# Patient Record
Sex: MALE | Race: WHITE | Employment: FULL TIME | URBAN - NONMETROPOLITAN AREA
[De-identification: names, ages, dates, MRNs, and addresses within clinical notes are randomized per-mention and may not be internally consistent; named-entity substitution may affect disease eponyms.]

---

## 2019-06-15 ENCOUNTER — HOSPITAL ENCOUNTER (EMERGENCY)
Age: 29
Discharge: HOME | End: 2019-06-15
Attending: FAMILY MEDICINE
Payer: COMMERCIAL

## 2019-06-15 ENCOUNTER — APPOINTMENT (OUTPATIENT)
Dept: GENERAL RADIOLOGY | Age: 29
End: 2019-06-15
Payer: COMMERCIAL

## 2019-06-15 VITALS
DIASTOLIC BLOOD PRESSURE: 69 MMHG | RESPIRATION RATE: 16 BRPM | HEART RATE: 100 BPM | WEIGHT: 175 LBS | OXYGEN SATURATION: 95 % | TEMPERATURE: 97.8 F | BODY MASS INDEX: 25.92 KG/M2 | HEIGHT: 69 IN | SYSTOLIC BLOOD PRESSURE: 120 MMHG

## 2019-06-15 DIAGNOSIS — J45.31 MILD PERSISTENT REACTIVE AIRWAY DISEASE WITH ACUTE EXACERBATION: Primary | ICD-10-CM

## 2019-06-15 DIAGNOSIS — J30.2 ACUTE SEASONAL ALLERGIC RHINITIS: ICD-10-CM

## 2019-06-15 PROCEDURE — 99284 EMERGENCY DEPT VISIT MOD MDM: CPT

## 2019-06-15 PROCEDURE — 6370000000 HC RX 637 (ALT 250 FOR IP)

## 2019-06-15 PROCEDURE — 71046 X-RAY EXAM CHEST 2 VIEWS: CPT

## 2019-06-15 PROCEDURE — 6370000000 HC RX 637 (ALT 250 FOR IP): Performed by: FAMILY MEDICINE

## 2019-06-15 RX ORDER — MONTELUKAST SODIUM 10 MG/1
10 TABLET ORAL NIGHTLY
Qty: 30 TABLET | Refills: 0 | Status: SHIPPED | OUTPATIENT
Start: 2019-06-15

## 2019-06-15 RX ORDER — ALBUTEROL SULFATE 90 UG/1
2 AEROSOL, METERED RESPIRATORY (INHALATION) EVERY 6 HOURS PRN
Qty: 1 INHALER | Refills: 3 | Status: SHIPPED | OUTPATIENT
Start: 2019-06-15

## 2019-06-15 RX ORDER — IPRATROPIUM BROMIDE AND ALBUTEROL SULFATE 2.5; .5 MG/3ML; MG/3ML
3 SOLUTION RESPIRATORY (INHALATION) ONCE
Status: COMPLETED | OUTPATIENT
Start: 2019-06-15 | End: 2019-06-15

## 2019-06-15 RX ORDER — PREDNISONE 10 MG/1
TABLET ORAL
Status: COMPLETED
Start: 2019-06-15 | End: 2019-06-15

## 2019-06-15 RX ORDER — FLUTICASONE PROPIONATE 50 MCG
SPRAY, SUSPENSION (ML) NASAL
Qty: 1 BOTTLE | Refills: 3 | Status: SHIPPED | OUTPATIENT
Start: 2019-06-15

## 2019-06-15 RX ORDER — PREDNISONE 50 MG/1
50 TABLET ORAL DAILY
Qty: 5 TABLET | Refills: 0 | Status: SHIPPED | OUTPATIENT
Start: 2019-06-15 | End: 2019-06-20

## 2019-06-15 RX ORDER — PREDNISONE 20 MG/1
60 TABLET ORAL ONCE
Status: COMPLETED | OUTPATIENT
Start: 2019-06-15 | End: 2019-06-15

## 2019-06-15 RX ADMIN — PREDNISONE 60 MG: 10 TABLET ORAL at 22:49

## 2019-06-15 RX ADMIN — IPRATROPIUM BROMIDE AND ALBUTEROL SULFATE 3 AMPULE: .5; 3 SOLUTION RESPIRATORY (INHALATION) at 22:48

## 2019-06-15 RX ADMIN — PREDNISONE 60 MG: 20 TABLET ORAL at 22:49

## 2019-06-16 NOTE — ED TRIAGE NOTES
Patient complains of shortness of breath and non productive cough for 1 week with no relief. Patient states history of Bronchitis with prescription of inhaler. Patient states some relief with inhaler use.

## 2019-06-16 NOTE — ED NOTES
Discharge instructions reviewed states understanding. Instructed on med use and need to f/u states understanding. Denies questions.       Lea Mathews RN  06/15/19 2904

## 2019-06-23 NOTE — ED PROVIDER NOTES
Triage Chief Complaint:   Shortness of Breath (SOB accompanied with a cough and congestion for a week, non productive cough. ) and Cough    [place]:  Oracio Hsu is a 30 y.o. male that presents history of similar symptoms but not as bad as the last several days who presents with runny nose, congestion, increased shortness of breath with activity, increased cough, and wheezing. Patient has tried over-the-counter medications with no improvement. No sore throat or stridor. No coryza fevers chills. No lower extremity edema or calf pain. No chest pain. ROS:  General:  No fevers, no chills, no weakness  Eyes:  No recent vison changes, no discharge  ENT: As above  Cardiovascular:  No chest pain, no palpitations  Respiratory: As above  Gastrointestinal:  No pain, no nausea, no vomiting, no diarrhea  Musculoskeletal:  No muscle pain, no joint pain  Skin:  No rash, no pruritis, no easy bruising  Neurologic:  No speech problems, no headache, no extremity numbness, no extremity tingling, no extremity weakness  Psychiatric:  No anxiety, no hallucinations or delusions, no suicidal or homicidal ideation  Genitourinary:  No dysuria, no hematuria  Endocrine:  No unexpected weight gain, no unexpected weight loss  Extremities:  no edema, no pain    History reviewed. No pertinent past medical history. Past Surgical History:   Procedure Laterality Date    HERNIA REPAIR       History reviewed. No pertinent family history.   Social History     Socioeconomic History    Marital status:      Spouse name: Not on file    Number of children: Not on file    Years of education: Not on file    Highest education level: Not on file   Occupational History    Not on file   Social Needs    Financial resource strain: Not on file    Food insecurity:     Worry: Not on file     Inability: Not on file    Transportation needs:     Medical: Not on file     Non-medical: Not on file   Tobacco Use    Smoking status: Never Smoker    Smokeless tobacco: Current User     Types: Chew   Substance and Sexual Activity    Alcohol use: Yes     Comment: occ    Drug use: No    Sexual activity: Yes     Partners: Female   Lifestyle    Physical activity:     Days per week: Not on file     Minutes per session: Not on file    Stress: Not on file   Relationships    Social connections:     Talks on phone: Not on file     Gets together: Not on file     Attends Restorationist service: Not on file     Active member of club or organization: Not on file     Attends meetings of clubs or organizations: Not on file     Relationship status: Not on file    Intimate partner violence:     Fear of current or ex partner: Not on file     Emotionally abused: Not on file     Physically abused: Not on file     Forced sexual activity: Not on file   Other Topics Concern    Not on file   Social History Narrative    Not on file     No current facility-administered medications for this encounter. Current Outpatient Medications   Medication Sig Dispense Refill    fluticasone (FLONASE) 50 MCG/ACT nasal spray Two sprays each nostril every single day in the AM 1 Bottle 3    albuterol sulfate HFA (PROVENTIL HFA) 108 (90 Base) MCG/ACT inhaler Inhale 2 puffs into the lungs every 6 hours as needed for Wheezing 1 Inhaler 3    montelukast (SINGULAIR) 10 MG tablet Take 1 tablet by mouth nightly 30 tablet 0     No Known Allergies    Nursing Notes Reviewed    Physical Exam:  ED Triage Vitals [06/15/19 2135]   Enc Vitals Group      /69      Pulse 100      Resp 16      Temp 97.8 °F (36.6 °C)      Temp src       SpO2 95 %      Weight 175 lb (79.4 kg)      Height 5' 9\" (1.753 m)      Head Circumference       Peak Flow       Pain Score       Pain Loc       Pain Edu? Excl. in [address]? My pulse ox interpretation is - normal    General appearance:  No acute distress. Skin:  Warm. Dry. No petechiae or purpura. Eye:  Extraocular movements intact.    PERRLA  Ears, nose, mouth and throat:  Oral mucosa moist, no trismus. Tympanic membranes bilaterally with poor movement. Oropharynx with no exudate or erythema. Boggy turbinates bilaterally. Postnasal drip present. Neck:  Trachea midline. Supple. No cervical lymphadenopathy  Extremity:  No swelling. Normal ROM. No calf pain or asymmetric swelling. No lower extremity edema  Heart:  Regular rate and rhythm, normal S1 & S2, no extra heart sounds. Perfusion:  Intact, capillary refill less than 2 seconds  Respiratory: Expiratory wheezing symmetrical bilaterally. I do not appreciate rales. Patient speaking in full sentences. No retractions. Pulse ox normal.  Abdominal:  Normal bowel sounds. Soft. No peritoneal signs. No hepatosplenomegaly. Back:  No CVA tenderness to palpation. No bruising. No CTL tenderness to palpation or step-off  Neurological:  Alert and oriented times 3. No focal neuro deficits. Cranial nerves II through XII are grossly intact. Psychiatric:  Appropriate    I have reviewed and interpreted all of the currently available lab results from this visit (if applicable):  No results found for this visit on 06/15/19. Radiographs (if obtained):  [] The following radiograph was interpreted by myself in the absence of a radiologist:   [] Radiologist's Report Reviewed:  XR CHEST STANDARD (2 VW)   Final Result   No evidence for acute cardiopulmonary process. EKG (if obtained): (All EKG's are interpreted by myself in the absence of a cardiologist)    Chart review shows recent radiographs:  Xr Chest Standard (2 Vw)    Result Date: 6/15/2019  EXAMINATION: TWO XRAY VIEWS OF THE CHEST 6/15/2019 9:51 pm COMPARISON: None. HISTORY: ORDERING SYSTEM PROVIDED HISTORY: cough, congestion, sob TECHNOLOGIST PROVIDED HISTORY: Reason for exam:->cough, congestion, sob Ordering Physician Provided Reason for Exam: sob, cough Acuity: Acute Type of Exam: Initial FINDINGS: Lungs are clear.   Cardiac and mediastinal silhouettes are within normal limits. No pneumothoraces. Bony structures appear intact. No evidence for acute cardiopulmonary process. MDM:  47-year-old male who likely has underlying reactive airway disease that is not been diagnosed now with either viral or more likely allergic complication causing increased symptoms. Vital signs are normal.  Not in respiratory distress. Normal pulse ox. Low index of suspicion for ACS or DVT or PE. Alternative diagnosis is present. Chest x-ray is negative for evidence of pneumothorax, hemothorax, infiltrate, cardiomegaly, pulmonary congestion, widened mediastinum. Patient treated with 3 duo nebs and 60 mg of p.o. Decadron. On repeat evaluation after DuoNeb's patient states he feels \"dramatically better\" and on exam he has significantly improved air exchange as well as only faint expiratory wheezes remaining. Consult placed for pulmonology for continued evaluation and likely PFTs. Flonase every single day and singular every single day for allergy control. 5-day prednisone burst.  Albuterol as needed. I estimate there is LOW risk for (including but not limited to) ACUTE CORONARY SYNDROME, PNEUMONIA REQUIRING ADMISSION, SEPSIS OR BACTERIAL MENINGITIS thus I consider the discharge disposition reasonable. Daren Bria (or their surrogate) and I have discussed the diagnosis and risks, and we agree with discharging home with close follow-up. We also discussed returning to the Emergency Department immediately if new or worsening symptoms occur. We have discussed the symptoms which are most concerning that necessitate immediate return. Clinical Impression:  1. Mild persistent reactive airway disease with acute exacerbation    2. Acute seasonal allergic rhinitis      Disposition referral (if applicable):  No follow-up provider specified.   Disposition medications (if applicable):  Discharge Medication List as of 6/15/2019 11:08 PM      START taking these medications    Details   fluticasone (FLONASE) 50 MCG/ACT nasal spray Two sprays each nostril every single day in the AM, Disp-1 Bottle, R-3Print      albuterol sulfate HFA (PROVENTIL HFA) 108 (90 Base) MCG/ACT inhaler Inhale 2 puffs into the lungs every 6 hours as needed for Wheezing, Disp-1 Inhaler, R-3Print      predniSONE (DELTASONE) 50 MG tablet Take 1 tablet by mouth daily for 5 days, Disp-5 tablet, R-0Print      montelukast (SINGULAIR) 10 MG tablet Take 1 tablet by mouth nightly, Disp-30 tablet, R-0Print             Comment: Please note this report has been produced using speech recognition software and may contain errors related to that system including errors in grammar, punctuation, and spelling, as well as words and phrases that may be inappropriate. If there are any questions or concerns please feel free to contact the dictating provider for clarification.       Greg Wayne MD  06/22/19 [id number]